# Patient Record
Sex: FEMALE | Race: WHITE | Employment: OTHER | URBAN - METROPOLITAN AREA
[De-identification: names, ages, dates, MRNs, and addresses within clinical notes are randomized per-mention and may not be internally consistent; named-entity substitution may affect disease eponyms.]

---

## 2018-02-14 ENCOUNTER — ALLIED HEALTH/NURSE VISIT (OUTPATIENT)
Dept: EDUCATION SERVICES | Facility: CLINIC | Age: 73
End: 2018-02-14
Payer: COMMERCIAL

## 2018-02-14 DIAGNOSIS — E11.9 TYPE 2 DIABETES, HBA1C GOAL < 7% (H): Primary | ICD-10-CM

## 2018-02-14 PROCEDURE — G0108 DIAB MANAGE TRN  PER INDIV: HCPCS

## 2018-02-14 NOTE — MR AVS SNAPSHOT
After Visit Summary   2/14/2018    Oriana Ely    MRN: [id number]           Patient Information     Date Of Birth          1945        Visit Information        Provider Department      2/14/2018 12:30 PM  DIABETIC ED RESOURCE [facility]        Care Instructions    My Diabetes Care Goals:    Monitoring: Continue to check blood sugars at minimum before breakfast daily. Goal blood sugar range of     Follow up:  Call ([phone]), e-mail ([email or web address]), or send [employer] message with questions, concerns or if follow-up is needed.     Bring blood glucose meter and logbook with you to all doctor and follow-up appointments.     [facility] for the [facility]:  For Your Diabetes Education and Nutrition Appointments Call:  [phone]   For Diabetes Education or Nutrition Related Questions:   Phone: [phone]  E-mail: [email or web address]  Fax: [phone]   If you need a medication refill please contact your pharmacy. Please allow 3 business days for your refills to be completed.    Instructions for emailing the Diabetes Educators    If you need to communicate a non-urgent message to a Diabetes Educator via email, please send to [email or web address].    Please follow the following email guidelines:    Subject line: Secure: your clinic name (example: Secure: [facility])  In the email please include: First name, middle initial, last name and date of birth.    We will be in touch with you within one (1) business day.           Follow-ups after your visit        Your next 10 appointments already scheduled     Mar 14, 2018 10:30 AM CDT   Diabetic Education with  DIABETIC ED RESOURCE   [facility] ([facility])    [address] [place] [address] [id number] [phone]              Who to contact     If you have questions or need follow up information  "about today's clinic visit or your schedule please contact [facility] directly at [phone].  Normal or non-critical lab and imaging results will be communicated to you by [employer], letter or phone within 4 business days after the clinic has received the results. If you do not hear from us within 7 days, please contact the clinic through [employer] or phone. If you have a critical or abnormal lab result, we will notify you by phone as soon as possible.  Submit refill requests through [employer] or call your pharmacy and they will forward the refill request to us. Please allow 3 business days for your refill to be completed.          Additional Information About Your Visit        [employer] Information     [employer]! lets you send messages to your doctor, view your test results, renew your prescriptions, schedule appointments and more. To sign up, go to [email or web address] . Click on \"Log in\" on the left side of the screen, which will take you to the Welcome page. Then click on \"Sign up Now\" on the right side of the page.     You will be asked to enter the access code listed below, as well as some personal information. Please follow the directions to create your username and password.     Your access code is: [email or web address]  Expires: 5/15/2018  1:25 PM     Your access code will  in 90 days. If you need help or a new code, please call your [facility] clinic or [phone].        Care EveryWhere ID     This is your Care EveryWhere ID. This could be used by other organizations to access your [facility] medical records  [id number]         Blood Pressure from Last 3 Encounters:   13 122/78   13 110/72   13 (!) 162/92    Weight from Last 3 Encounters:   13 71.4 kg (157 lb 6.4 oz)   13 70 kg (154 lb 6.4 oz)   13 70.3 kg (155 lb)              Today, you had the following     No orders found for display       Primary Care Provider Office Phone # Fax #    Meghan " Christ Soto [phone]       [facility] [address] [place] [address]        Equal Access to Services     BILL BALTAZAR : Hadamna disha bennett nidhi Soewelina, waaxda luqadaha, qaybta kaalmada richard, chana gao laKarelydelma sanchez. So [facility] [phone].    ATENCIÓN: Si habla español, tiene a rios disposición servicios gratuitos de asistencia lingüística. Llame al [phone].    We comply with applicable federal civil rights laws and [place] laws. We do not discriminate on the basis of race, color, national origin, age, disability, sex, sexual orientation, or gender identity.            Thank you!     Thank you for choosing [facility]  for your care. Our goal is always to provide you with excellent care. Hearing back from our patients is one way we can continue to improve our services. Please take a few minutes to complete the written survey that you may receive in the mail after your visit with us. Thank you!             Your Updated Medication List - Protect others around you: Learn how to safely use, store and throw away your medicines at www.disposemymeds.org.          This list is accurate as of 2/14/18  1:25 PM.  Always use your most recent med list.                   Brand Name Dispense Instructions for use Diagnosis    blood glucose monitoring test strip    no brand specified     1 strip by In Vitro route 3 times daily. Tests up to 4 times daily        gabapentin 300 MG capsule    NEURONTIN    90 capsule    Take 1 capsule by mouth At Bedtime.    Neuropathy in diabetes (H)       insulin glargine 100 UNIT/ML injection    LANTUS SOLOSTAR    3 Month    Inject 24 Units Subcutaneous At Bedtime.    Type 2 diabetes, HbA1c goal < 7% (H)       * insulin pen needle 31G X 5 MM     3 Box    1 Box by Device route See Admin Instructions. Pen needles of choice.  Use once a day    Diabetes mellitus, type 2 (H)       * ULTICARE MINI 31G X 6 MM    Generic drug:  insulin pen needle     100 each    Use daily or as directed.  Pen needle of choice.    Type 2 diabetes, HbA1c goal < 7% (H)       lisinopril-hydrochlorothiazide 20-12.5 MG per tablet    PRINZIDE/ZESTORETIC    90 tablet    Take 1 tablet by mouth daily.    Hypertension       metFORMIN 500 MG 24 hr tablet    GLUCOPHAGE-XR    360 tablet    4TPOQD    Type II or unspecified type diabetes mellitus without mention of complication, uncontrolled       order for DME     400 each    Test strips for pt's glucometer, brand as covered by insurance. Test four times daily and prn.    Type 2 diabetes, HbA1c goal < 7% (H)       pravastatin 40 MG tablet    PRAVACHOL    90 tablet    Take 1 tablet by mouth daily.    Other and unspecified hyperlipidemia       * Notice:  This list has 2 medication(s) that are the same as other medications prescribed for you. Read the directions carefully, and ask your doctor or other care provider to review them with you.

## 2018-02-14 NOTE — PATIENT INSTRUCTIONS
My Diabetes Care Goals:    Monitoring: Continue to check blood sugars at minimum before breakfast daily. Goal blood sugar range of     Follow up:  Call ([phone]), e-mail ([email or web address]), or send [employer] message with questions, concerns or if follow-up is needed.     Bring blood glucose meter and logbook with you to all doctor and follow-up appointments.     [facility] for the [facility]:  For Your Diabetes Education and Nutrition Appointments Call:  [phone]   For Diabetes Education or Nutrition Related Questions:   Phone: [phone]  E-mail: [email or web address]  Fax: [phone]   If you need a medication refill please contact your pharmacy. Please allow 3 business days for your refills to be completed.    Instructions for emailing the Diabetes Educators    If you need to communicate a non-urgent message to a Diabetes Educator via email, please send to [email or web address].    Please follow the following email guidelines:    Subject line: Secure: your clinic name (example: Secure: [facility])  In the email please include: First name, middle initial, last name and date of birth.    We will be in touch with you within one (1) business day.

## 2018-02-14 NOTE — PROGRESS NOTES
Diabetes Self Management Training: Individual Review Visit    Oriana Ely presents today for education and evaluation of glucose control related to Type 2 diabetes.    She is accompanied by self    Patient's diabetes management related comments/concerns: Meghan, her NP, told her to come here     Patient's emotional response to diabetes: expresses readiness to learn and frustration related to cost of meds    Patient would like this visit to be focused around the following diabetes-related behaviors and goals: Healthy Eating and Taking Medication    ASSESSMENT:  Patient Problem List and Family Medical History reviewed for relevant medical history, current medical status, and diabetes risk factors.    Current Diabetes Management per Patient:  Taking diabetes medications?   yes:     Diabetes Medication(s)     Biguanides Sig    metFORMIN (GLUCOPHAGE-XR) 500 MG 24 hr tablet 4TPOQD    Insulin Sig    insulin glargine (LANTUS SOLOSTAR) 100 UNIT/ML injection Inject 24 Units Subcutaneous At Bedtime.      Adjusting medications due to high cost of levemir. Currently taking 13 units of levemir daily. Was at 36 units/day and weaning per NP. Continues on metformin 2000 mg/day & glimepiride 4 mg BID. Plans to  and start a new medication today (referral mentions Farxiga but pt didn't think this was the name of it).    *Abbreviated insulin dose documentation key: Insulin Trade Name (oohhpbhem-zkstz-bliqtr-bedtime) - i.e. Humalog 5-5-5-0 (Humalog 5 units at breakfast, 5 units at lunch, and 5 units at dinner).    Past Diabetes Education: Yes    Patient glucose self monitoring as follows: has been testing the past 3 days but prior to this was not testing blood sugars at all.   BG meter: One Touch Ultra 2 meter  BG results:   Date Breakfast  Lunch  Dinner  Bedtime    Before After Before After Before After    2/11   308  311  203   2/12 307      395   2/13 237    382  328   2/14 228            BG values are: Not in  "goal  Hemoglobin A1C 9.1% on 1/18/18    Nutrition:  Patient skips lunch regularly    Breakfast - (10am) banana or piece of toast, occasionally oatmeal or dry cereal; sometimes skips  Lunch - skips   Dinner - (4-6pm) hot dish, bratwurst in bun, pork chops or steak, veggie & baked potato    Snacks - anything with sugar, homemade cookies (1-3/day), candy or chocolate-covered peanuts     Beverages: Coffee, Water, Crystal Light or Diet soda 2x/month    Cultural/Mormonism diet restrictions: No     Biggest Challenge to Healthy Eating: knowing what to eat    Physical Activity:    Not discussed    Diabetes Risk Factors:  age over 45 years, hypertension, hyperlipidemia, overweight/obesity and inactivity    Diabetes Complications:  Acute Complications: At risk for hypoglycemia? yes  Symptoms of hyperglycemia? none    Vitals:  There were no vitals taken for this visit.  Estimated body mass index is 29.26 kg/(m^2) as calculated from the following:    Height as of 4/17/13: 1.562 m (5' 1.5\").    Weight as of 4/17/13: 71.4 kg (157 lb 6.4 oz).   Last 3 BP:   BP Readings from Last 3 Encounters:   04/17/13 122/78   03/06/13 110/72   03/01/13 (!) 162/92       History   Smoking Status     Former Smoker     Quit date: 2/6/1973   Smokeless Tobacco     Never Used       Labs:  Chol 166  LDL 95  HDL 55  Above labs from 10/4/17    No results found for: MICROALBUMIN    Socio/Economic Considerations:    Support system: not assessed    Health Beliefs and Attitudes:   Patient Activation Measure Survey Score:  No flowsheet data found.    Stage of Change: PREPARATION (Decided to change - considering how)      Diabetes knowledge and skills assessment:     Patient is knowledgeable in diabetes management concepts related to: Healthy Eating, Monitoring and Taking Medication    Patient needs further education on the following diabetes management concepts: Healthy Eating, Being Active, Monitoring, Taking Medication, Problem Solving, Reducing Risks and " Healthy Coping    Barriers to Learning Assessment: No Barriers identified    Based on learning assessment above, most appropriate setting for further diabetes education would be: Individual setting.    INTERVENTION:   Pt's blood sugars above goal. Currently, pt is tapering insulin and starting a new medication due to high cost of insulin. Blood sugars, however, are not controlled. Will reach out to PCP to discuss option of NPH Relion insulin at [facility] as a more cost effective insulin option. Suspect she will need insulin to more optimally control her blood sugars.     Per IDC guidelines, to change from basal insulin to NPH with A1C >9%, it is a 1:1 conversion. Recommend to split 50:50. Showed her how to give an insulin injection with syringe and vial today in case her PCP is on board with this plan. If this is the plan, reiterated importance of consuming lunch each day as well as treatment of low blood sugars.     Education provided today on:  AADE Self-Care Behaviors:  Healthy Eating: consistency in amount, composition, and timing of food intake and weight reduction  Monitoring: log and interpret results, individual blood glucose targets and frequency of monitoring, reiterated importance of regularly checking blood sugar at home, at least once daily, for med adjustments.   Taking Medication: action of prescribed medication, drawing up, administering and storing injectable diabetes medications, proper site selection and rotation for injections, side effects of prescribed medications and when to take medications  Problem Solving: low blood glucose - causes, signs/symptoms, treatment and prevention    Opportunities for ongoing education and support in diabetes-self management were discussed.    Pt verbalized understanding of concepts discussed and recommendations provided today.       Education Materials Provided:  Hypoglycemia Signs and Symptoms, 1200 Calorie Meal Plan and How to Measure & Inject Insulin      PLAN:  Meal Plan Recommendation: eat 3 meals a day and use portion control  Check blood sugars fasting  Will reach out to PCP about switching pt to NPH insulin. Would recommend 18 units BID at breakfast and bedtime.  Will update pt upon talking to PCP.   AVS printed and provided to patient today.    FOLLOW-UP:  Follow-up appointment scheduled on 3/14/18.  Chart routed to referring provider.    Ongoing plan for education and support: Written resources (magazines, books, etc.), Follow-up visit with diabetes educator in 1 month and Follow-up with primary care provider    RICKEY Sweeney CDE  Time Spent: 60 minutes  Encounter Type: Individual    Any diabetes medication dose changes were made via the CDE Protocol and Collaborative Practice Agreement with the patient's referring provider. A copy of this encounter was shared with the provider.      Left message with MA at pt's PCP clinic re: elevated blood sugars and possible switch back to insulin but changing to a cheaper insulin. PCP is out today. Gave them our number to call back with plan.

## 2018-02-15 ENCOUNTER — TELEPHONE (OUTPATIENT)
Dept: EDUCATION SERVICES | Facility: CLINIC | Age: 73
End: 2018-02-15

## 2018-02-15 NOTE — TELEPHONE ENCOUNTER
As pt was on 36 units Levemir previously (has been tapering and is now on 13 units) and her A1c is >9%, recommend 1:1 conversion for 70/30.  Therefore, will initiate 18 u BID (before breakfast and dinner) of novolin 70/30. Will order syringe and vial for her d/t cost savings. Already instructed her on syringe/vial yesterday. Called pt to update her but no answer. Left vm with c/b number and will try to call tomorrow as well to update her and see which pharmacy she would like this order to be sent to.     Karin Borden RD LD CDE

## 2018-02-15 NOTE — TELEPHONE ENCOUNTER
Meghan, NATHEN, called for DOMINIK Frazier.     Meghan agrees that patient needs to restart insulin.     Verbal order for 70/30 mix BID for patient who may need better mealtime coverage. Also recommends consider vial and syringe for cost savings.     Meghan reports that Oriana's previous med dose was 36 units Levemir and 4 mg Glimepiride BID.     She asks that DOMINIK Frazier, work with patient and calculate dosing, provide instruction.     Call Meghan if any questions.     Edyta Valenzuela RD, LD, CDE

## 2018-02-16 ENCOUNTER — TELEPHONE (OUTPATIENT)
Dept: EDUCATION SERVICES | Facility: CLINIC | Age: 73
End: 2018-02-16

## 2018-02-16 NOTE — TELEPHONE ENCOUNTER
Oriana called again for Karin.   I called back as Karin is not available. No answer, left message that I will let Karin know and we will be in touch on Monday.     Edyta Valenzuela RD, LD, CDE

## 2018-02-16 NOTE — TELEPHONE ENCOUNTER
Called pt's PCP this morning to discuss plan. Discussed DC'ing her JENSEN with the addition of premixed 70/30 starting.  PCP is on board with this plan.  Also discussed starting dose of 18 u BID and ordering disposable syringes for pt.  PCP reports she will place the order for insulin and syringes as well as DC the glimepiride. Called pt to update her but no answer so left vm with c/b number and stated that her PCP did place a new order for her to her pharmacy. Will try to call again later today.  Did not explain that she will have to mix this insulin during our visit 2 days ago so would like to discuss this with her.     Karin Borden, RICKEY LD CDE

## 2018-02-19 NOTE — TELEPHONE ENCOUNTER
Called pt back as she tried calling 2/16 after I was gone for the day.  No answer. Left .     Karin Borden, RD LD CDE

## 2018-02-20 NOTE — TELEPHONE ENCOUNTER
Called again but still no answer. Left another vm. Unfortunately no direct line for this writer at this current clinic so left our triage number for c/b.     Karin Borden, RD LD CDE

## 2018-02-20 NOTE — TELEPHONE ENCOUNTER
Called pt back. No answer. Left vm. Will try to call again this afternoon to see if I reach her.     Karin Borden, RICKEY LD CDE

## 2018-02-21 ENCOUNTER — TELEPHONE (OUTPATIENT)
Dept: EDUCATION SERVICES | Facility: CLINIC | Age: 73
End: 2018-02-21

## 2018-02-21 NOTE — TELEPHONE ENCOUNTER
Pt is unable to afford insulin and is asking for an alternative med.     She says she was told it would cost 25. but the real cost was 138. She did not not know which insulin.    Pt is available for a return call today before noon then after 3pm.

## 2018-02-22 NOTE — TELEPHONE ENCOUNTER
Called pt. Her PCP had ordered the insulin and it did not go [facility] a [facility] so was more expensive than we discussed.  Tried to call her clinic this am but they are closed yet. Will call later today and have her PCP re-route the rx for her insulin vials to [facility] and specify the relion brand so it is the cheaper insulin.      Pt does not have any other questions at this time.   Karin Borden, RICKEY LD CDE

## 2018-03-14 ENCOUNTER — ALLIED HEALTH/NURSE VISIT (OUTPATIENT)
Dept: EDUCATION SERVICES | Facility: CLINIC | Age: 73
End: 2018-03-14
Payer: COMMERCIAL

## 2018-03-14 DIAGNOSIS — E11.9 TYPE 2 DIABETES, HBA1C GOAL < 7% (H): Primary | ICD-10-CM

## 2018-03-14 PROCEDURE — G0108 DIAB MANAGE TRN  PER INDIV: HCPCS

## 2018-03-14 NOTE — PATIENT INSTRUCTIONS
My Diabetes Care Goals:    45-60 grams of carbohydrate at meals, 15-30 grams of carbohydrate at snacks  150 minutes/week activity    Follow up:  Follow-up diabetes education appointment scheduled on 4/18/18.      Bring blood glucose meter and logbook with you to all doctor and follow-up appointments.     [facility] for the [facility]:  For Your Diabetes Education and Nutrition Appointments Call:  [phone]   For Diabetes Education or Nutrition Related Questions:   Phone: [phone]  E-mail: [email or web address]  Fax: [phone]   If you need a medication refill please contact your pharmacy. Please allow 3 business days for your refills to be completed.    Instructions for emailing the Diabetes Educators    If you need to communicate a non-urgent message to a Diabetes Educator via email, please send to [email or web address].    Please follow the following email guidelines:    Subject line: Secure: your clinic name (example: Secure: [facility])  In the email please include: First name, middle initial, last name and date of birth.    We will be in touch with you within one (1) business day.

## 2018-03-14 NOTE — PROGRESS NOTES
Agree with plan to increase insulin 10% to 22 units in the evening r/t elevated morning BGs.    Karin Borden, RD LD CDE

## 2018-03-14 NOTE — PROGRESS NOTES
Diabetes Self Management Training: Follow-up Visit    Oriana Ely presents today for education and evaluation of glucose control related to Type 2 diabetes.    She is accompanied by self    Patient's diabetes management related comments/concerns: follow up on blood sugars     Patient would like this visit to be focused around the following diabetes-related behaviors and goals: Healthy Eating, Being Active, Monitoring, Taking Medication and Problem Solving    ASSESSMENT:  Patient Problem List reviewed for relevant medical history and current medical status.    Current Diabetes Management per Patient:  Taking diabetes medications?   yes:     Diabetes Medication(s)     Biguanides Sig    metFORMIN (GLUCOPHAGE-XR) 500 MG 24 hr tablet 4TPOQD    Insulin Sig           Relion 70/30 18-0-0-18, started on 3/1/18 after she ran out of levemir    *Abbreviated insulin dose documentation key: Insulin Trade Name (skniimnes-zvdqr-mjlnqv-bedtime) - i.e. Humalog 5-5-5-0 (Humalog 5 units at breakfast, 5 units at lunch, and 5 units at dinner).    Patient glucose self monitoring as follows: one time daily.   BG meter: One Touch Ultra 2 meter  BG results:    Date Breakfast  Lunch  Dinner  Bedtime    Before After Before After Before After    3/14 263         3/13 221         3/12 187         3/11 321         3/10 176         3/9 284         3/8 274    94       BG values are: Not in goal, Mahamed  Patient's most recent   Lab Results   Component Value Date    A1C 11.6 04/17/2013    is not meeting goal of <7.0    Nutrition:  Patient skips breakfast and lunch regularly, states that there have been too many parties with foods at them and she has struggled with this    Breakfast - orange  Lunch - granola bar    Dinner - biggest meal - hot dish, stew, chili, hamburger or hot dog with chips  Snacks - candy if its available, crackers, pretzels, walnuts    Beverages: black coffee or water, diet soda 1-2x/month    Cultural/Rastafarian diet  "restrictions: No     Biggest Challenge to Healthy Eating: evening snacking, knowing what to eat and eating in social settings/gatherings    Physical Activity:    No physical activity at this time but she acknowledges that she needs to increase this     Diabetes Complications:  Acute Complications: At risk for hypoglycemia? yes  Frequency of hypoglycemia: never  Patient carries a carbohydrate source with them regularly: Yes     Vitals:  There were no vitals taken for this visit.  Estimated body mass index is 29.26 kg/(m^2) as calculated from the following:    Height as of 4/17/13: 1.562 m (5' 1.5\").    Weight as of 4/17/13: 71.4 kg (157 lb 6.4 oz).   Last 3 BP:   BP Readings from Last 3 Encounters:   04/17/13 122/78   03/06/13 110/72   03/01/13 (!) 162/92       History   Smoking Status     Former Smoker     Quit date: 2/6/1973   Smokeless Tobacco     Never Used       Labs:  Lab Results   Component Value Date    A1C 11.6 04/17/2013     Lab Results   Component Value Date     04/17/2013     Lab Results   Component Value Date    LDL 68 12/26/2012     HDL Cholesterol   Date Value Ref Range Status   12/26/2012 58 50 - 110 mg/dL Final   ]  GFR Estimate   Date Value Ref Range Status   04/17/2013 62 >60 mL/min/1.7m2 Final     GFR Estimate If Black   Date Value Ref Range Status   04/17/2013 76 >60 mL/min/1.7m2 Final     Lab Results   Component Value Date    CR 0.90 04/17/2013     No results found for: MICROALBUMIN    Health Beliefs and Attitudes:   Patient Activation Measure Survey Score:  No flowsheet data found.    Stage of Change: ACTION (Actively working towards change)    Diabetes knowledge and skills assessment:     Patient is knowledgeable in diabetes management concepts related to: Monitoring, Taking Medication and Problem Solving    Patient needs further education on the following diabetes management concepts: Healthy Eating, Being Active, Monitoring, Taking Medication, Problem Solving, Reducing Risks and " Healthy Coping    Barriers to Learning Assessment: No Barriers identified    Based on learning assessment above, most appropriate setting for further diabetes education would be: Group class or Individual setting.    INTERVENTION:  With 100% of pt's morning blood sugars outside of goal, pt would benefit from an increased dose of her ReliOn 70/30 insulin in the evening as well as an adjustment in the timing of her insulin dosing. Recommend she increase to 22 units prior to her evening meal, accounting for a 11% increase in her TDD, and continue with 18 units in the morning, prior to breakfast.  Would also recommend increased frequency of blood sugar checks to ensure that her numbers during the day are within goal with adjusted insulin dosing. Recommend continue with a morning fasting blood sugar check and then try to check once more during the day, either right before a meal or 2 hours after a meal.  Encouraged renewed focus on diet & exercise to also improve blood sugar control.     Education provided today on:  AADE Self-Care Behaviors:  Healthy Eating: carbohydrate counting, consistency in amount, composition, and timing of food intake, weight reduction and portion control  Being Active: relationship to blood glucose and describe appropriate activity program, recommended goal of 150 minutes/week physical activity   Monitoring: individual blood glucose targets and frequency of monitoring  Taking Medication: action of prescribed medication and discussed likely plan to increase evening dose of insulin and have pt take this prior to her dinner rather than prior to bedtime.   Problem Solving: low blood glucose - causes, signs/symptoms, treatment and prevention and carrying a carbohydrate source at all times    Opportunities for ongoing education and support in diabetes-self management were discussed.    Pt verbalized understanding of concepts discussed and recommendations provided today.       Education Materials  Provided:  Carbohydrate Counting    PLAN:  See Patient Instructions for co-developed, patient-stated behavior change goals.  Recommend increase to insulin - ReliOn 70/30 18-0-22-0  AVS printed and provided to patient today.    FOLLOW-UP:  Follow-up appointment scheduled on April 18, 2018.  Note faxed to referring provider at outside clinic.    Ongoing plan for education and support: Follow-up visit with diabetes educator in 1 month    Rachell Kim RD, LD  Time Spent: 45 minutes  Encounter Type: Individual    Any diabetes medication dose changes were made via the CDE Protocol and Collaborative Practice Agreement with the patient's referring provider. A copy of this encounter was shared with the provider.

## 2018-03-14 NOTE — MR AVS SNAPSHOT
After Visit Summary   3/14/2018    Oriana Ely    MRN: [id number]           Patient Information     Date Of Birth          1945        Visit Information        Provider Department      3/14/2018 10:30 AM  DIABETIC ED RESOURCE [facility]        Today's Diagnoses     Type 2 diabetes, HbA1c goal < 7% (H)    -  1      Care Instructions    My Diabetes Care Goals:    45-60 grams of carbohydrate at meals, 15-30 grams of carbohydrate at snacks  150 minutes/week activity    Follow up:  Follow-up diabetes education appointment scheduled on 4/18/18.      Bring blood glucose meter and logbook with you to all doctor and follow-up appointments.     [facility] for the [facility] Area:  For Your Diabetes Education and Nutrition Appointments Call:  [phone]   For Diabetes Education or Nutrition Related Questions:   Phone: [phone]  E-mail: [email or web address]  Fax: [phone]   If you need a medication refill please contact your pharmacy. Please allow 3 business days for your refills to be completed.    Instructions for emailing the Diabetes Educators    If you need to communicate a non-urgent message to a Diabetes Educator via email, please send to [email or web address].    Please follow the following email guidelines:    Subject line: Secure: your clinic name (example: Secure: [facility])  In the email please include: First name, middle initial, last name and date of birth.    We will be in touch with you within one (1) business day.             Follow-ups after your visit        Your next 10 appointments already scheduled     Apr 18, 2018 10:30 AM CDT   Diabetic Education with  DIABETIC ED RESOURCE   [facility] ([facility])    [address] [place] [id number] [phone]              Who to contact     If you have questions or need follow up information  "about today's clinic visit or your schedule please contact [facility] directly at [phone].  Normal or non-critical lab and imaging results will be communicated to you by CatchSquarehart, letter or phone within 4 business days after the clinic has received the results. If you do not hear from us within 7 days, please contact the clinic through CatchSquarehart or phone. If you have a critical or abnormal lab result, we will notify you by phone as soon as possible.  Submit refill requests through ShoeDazzle or call your pharmacy and they will forward the refill request to us. Please allow 3 business days for your refill to be completed.          Additional Information About Your Visit        CatchSquareharReading Room Information     ShoeDazzle lets you send messages to your doctor, view your test results, renew your prescriptions, schedule appointments and more. To sign up, go to [email or web address] . Click on \"Log in\" on the left side of the screen, which will take you to the Welcome page. Then click on \"Sign up Now\" on the right side of the page.     You will be asked to enter the access code listed below, as well as some personal information. Please follow the directions to create your username and password.     Your access code is: [email or web address]  Expires: 5/15/2018  2:25 PM     Your access code will  in 90 days. If you need help or a new code, please call your [facility] clinic or [phone].        Care EveryWhere ID     This is your Care EveryWhere ID. This could be used by other organizations to access your [facility] medical records  [id number]         Blood Pressure from Last 3 Encounters:   13 122/78   13 110/72   13 (!) 162/92    Weight from Last 3 Encounters:   13 71.4 kg (157 lb 6.4 oz)   13 70 kg (154 lb 6.4 oz)   13 70.3 kg (155 lb)              Today, you had the following     No orders found for display       Primary Care Provider Office Phone # Fax #    Meghan " Christ Soto [phone]       [facility] [address] [place] [address]        Equal Access to Services     BILL BALTAZAR : Hadamna disha bennett nidhi Soewelina, wasammyda luqadaha, qaybta kaalmada richard, chana gao ladcpaula sanchez. So [facility] [phone].    ATENCIÓN: Si habla español, tiene a rios disposición servicios gratuitos de asistencia lingüística. Llame al [phone].    We comply with applicable federal civil rights laws and [place] laws. We do not discriminate on the basis of race, color, national origin, age, disability, sex, sexual orientation, or gender identity.            Thank you!     Thank you for choosing [facility]  for your care. Our goal is always to provide you with excellent care. Hearing back from our patients is one way we can continue to improve our services. Please take a few minutes to complete the written survey that you may receive in the mail after your visit with us. Thank you!             Your Updated Medication List - Protect others around you: Learn how to safely use, store and throw away your medicines at www.disposemymeds.org.          This list is accurate as of 3/14/18 11:07 AM.  Always use your most recent med list.                   Brand Name Dispense Instructions for use Diagnosis    blood glucose monitoring test strip    no brand specified     1 strip by In Vitro route 3 times daily. Tests up to 4 times daily        gabapentin 300 MG capsule    NEURONTIN    90 capsule    Take 1 capsule by mouth At Bedtime.    Neuropathy in diabetes (H)       insulin glargine 100 UNIT/ML injection    LANTUS SOLOSTAR    3 Month    Inject 24 Units Subcutaneous At Bedtime.    Type 2 diabetes, HbA1c goal < 7% (H)       * insulin pen needle 31G X 5 MM     3 Box    1 Box by Device route See Admin Instructions. Pen needles of choice.  Use once a day    Diabetes mellitus, type 2 (H)       * ULTICARE MINI 31G X 6 MM    Generic drug:  insulin pen needle     100 each    Use daily or as directed.  Pen needle of choice.    Type 2 diabetes, HbA1c goal < 7% (H)       lisinopril-hydrochlorothiazide 20-12.5 MG per tablet    PRINZIDE/ZESTORETIC    90 tablet    Take 1 tablet by mouth daily.    Hypertension       metFORMIN 500 MG 24 hr tablet    GLUCOPHAGE-XR    360 tablet    4TPOQD    Type II or unspecified type diabetes mellitus without mention of complication, uncontrolled       order for DME     400 each    Test strips for pt's glucometer, brand as covered by insurance. Test four times daily and prn.    Type 2 diabetes, HbA1c goal < 7% (H)       pravastatin 40 MG tablet    PRAVACHOL    90 tablet    Take 1 tablet by mouth daily.    Other and unspecified hyperlipidemia       * Notice:  This list has 2 medication(s) that are the same as other medications prescribed for you. Read the directions carefully, and ask your doctor or other care provider to review them with you.

## 2018-04-17 ENCOUNTER — ALLIED HEALTH/NURSE VISIT (OUTPATIENT)
Dept: EDUCATION SERVICES | Facility: CLINIC | Age: 73
End: 2018-04-17
Payer: COMMERCIAL

## 2018-04-17 DIAGNOSIS — E11.9 TYPE 2 DIABETES, HBA1C GOAL < 7% (H): Primary | ICD-10-CM

## 2018-04-17 PROCEDURE — G0108 DIAB MANAGE TRN  PER INDIV: HCPCS

## 2018-04-17 PROCEDURE — 95250 CONT GLUC MNTR PHYS/QHP EQP: CPT

## 2018-04-17 NOTE — PATIENT INSTRUCTIONS
1. Plan to wear the LibrePro sensor for 14 days. It is okay to shower, bathe, and swim (up to 3 feet deep for 30 minutes)    2. Continue with your usual diabetes care plan - check blood sugars and take medicines, as prescribed.    3. Keep a log of what you eat and drink, when you take your medications and how much you take, and exercise you do while you are wearing the sensor.    3. Do not cover the sensor with extra adhesive (the small hole in the center of the sensor must remain uncovered)    4. Use a little extra care, especially when getting dressed or exercising, to avoid accidentally loosening or removing the sensor.     5. Remove the sensor if you need to have an MRI or CT scan.     Return the sensor to the [facility] on May 1.    Follow-up appointment: May 8.    [facility] for the [facility] Area:  For Your Diabetes Education and Nutrition Appointments Call:  [phone]   For Diabetes Education or Nutrition Related Questions:   Phone: [phone]  E-mail: [email or web address]  Fax: [phone]   If you need a medication refill please contact your pharmacy. Please allow 3 business days for your refills to be completed.    Instructions for emailing the Diabetes Educators    If you need to communicate a non-urgent message to a Diabetes Educator via email, please send to [email or web address].    Please follow the following email guidelines:    Subject line: Secure: your clinic name (example: Secure: [facility])  In the email please include: First name, middle initial, last name and date of birth.    We will be in touch with you within one (1) business day.

## 2018-04-17 NOTE — PROGRESS NOTES
Diabetes Self Management Training: Follow-up Visit    Oriana Ely presents today for education and evaluation of glucose control related to Type 2 diabetes.    She is accompanied by self    Patient's diabetes management related comments/concerns: feels the insulin isn't working    Patient would like this visit to be focused around the following diabetes-related behaviors and goals: Taking Medication    ASSESSMENT:  Patient Problem List reviewed for relevant medical history and current medical status.    Current Diabetes Management per Patient:  Taking diabetes medications?   yes:     Diabetes Medication(s)     Biguanides Sig    metFORMIN (GLUCOPHAGE-XR) 500 MG 24 hr tablet 4TPOQD    Insulin Sig    insulin NPH-insulin regular (HUMULIN MIX 70/30 VIAL) injection 18 units before breakfast  22 units before dinner        *Abbreviated insulin dose documentation key: Insulin Trade Name (lamloptxb-mowhz-qvpcpo-bedtime) - i.e. Humalog 5-5-5-0 (Humalog 5 units at breakfast, 5 units at lunch, and 5 units at dinner).    Patient glucose self monitoring as follows: one time daily.   BG meter: One Touch Ultra 2 meter  BG results:        BG values are: Not in goal  Patient's most recent   Lab Results   Component Value Date    A1C 11.6 04/17/2013    is not meeting goal of <7.0   Most recent Hgb A1C was 9.1% on 1/18/18    Nutrition:,  Patient skips lunch regularly    Breakfast - granola bar, clemetine OR banana  Lunch - sandwich OR soup OR leftovers OR skips   Dinner - sloppy joes OR meatloaf with mashed potatoes, peas OR hot dish     Snacks - clemetines, granola bar     Beverages: Crystal Light, black coffee, water    Cultural/Jainism diet restrictions: No     Biggest Challenge to Healthy Eating: skipping meals     Physical Activity:    Type: walking, ~10 blocks  Duration: 30+ minutes  Frequency: 3 days/week    Diabetes Complications:  Chronic Complications: neuropathy    Vitals:  There were no vitals taken for this  "visit.  Estimated body mass index is 29.26 kg/(m^2) as calculated from the following:    Height as of 4/17/13: 1.562 m (5' 1.5\").    Weight as of 4/17/13: 71.4 kg (157 lb 6.4 oz).   Last 3 BP:   BP Readings from Last 3 Encounters:   04/17/13 122/78   03/06/13 110/72   03/01/13 (!) 162/92       History   Smoking Status     Former Smoker     Quit date: 2/6/1973   Smokeless Tobacco     Never Used       Labs:  Lab Results   Component Value Date    A1C 11.6 04/17/2013     Lab Results   Component Value Date     04/17/2013     Lab Results   Component Value Date    LDL 68 12/26/2012     HDL Cholesterol   Date Value Ref Range Status   12/26/2012 58 50 - 110 mg/dL Final   ]  GFR Estimate   Date Value Ref Range Status   04/17/2013 62 >60 mL/min/1.7m2 Final     GFR Estimate If Black   Date Value Ref Range Status   04/17/2013 76 >60 mL/min/1.7m2 Final     Lab Results   Component Value Date    CR 0.90 04/17/2013     No results found for: MICROALBUMIN    Health Beliefs and Attitudes:   Patient Activation Measure Survey Score:  No flowsheet data found.    Stage of Change: ACTION (Actively working towards change)    Progress toward meeting diabetes-related behavioral goals:    GOALS % Met Goal   Healthy Eating  75   Physical Activity 75   Monitoring 75   Medication Taking 50   Problem Solving 50   Healthy Coping 0   Risk Reduction 25         Diabetes knowledge and skills assessment:     Patient is knowledgeable in diabetes management concepts related to: Healthy Eating, Being Active and Monitoring    Patient needs further education on the following diabetes management concepts: Healthy Eating, Being Active, Monitoring, Taking Medication, Problem Solving, Reducing Risks and Healthy Coping    Barriers to Learning Assessment: No Barriers identified    Based on learning assessment above, most appropriate setting for further diabetes education would be: Group class or Individual setting.    INTERVENTION:    Education provided " today on:  AADE Self-Care Behaviors:  Healthy Eating: consistency in amount, composition, and timing of food intake  Being Active: relationship to blood glucose  Monitoring: individual blood glucose targets and frequency of monitoring  Taking Medication: action of prescribed medication, side effects of prescribed medications and when to take medications. Patient has been taking evening insulin prior to bed. Discussed transitioning to take this med prior to dinner in order to help cover post-meal spikes and increasing both doses by 10%. New dosing of 20-0-24-0 recommended.    Problem Solving: low blood glucose - causes, signs/symptoms, treatment and prevention and carrying a carbohydrate source at all times  Reducing Risks: prevention, early diagnostic measures and treatment of complications and A1C - goals, relating to blood glucose levels, how often to check, patient due for Hgb A1c check. Will order this prior to next visit.     LibrePro sensor started today. (Lot # [id number], Serial # [id number], Expiration date 2018-10-31) Sensor was inserted with no resistance or bleeding at insertion site.    Pt will plan to wear the sensor through  5/1/18.    WRITTEN AND VERBAL INFORMATION GIVEN TO SUPPORT UNDERSTANDING OF:  LibrePro CGM: Sensor insertion, intention of monitoring for 14 days. Keep records of BG, food intake, exercise, and medication dosing during wear.       Patient verbalizes understanding of how to remove sensor and all instructions provided.     Educational and other materials:  Food/exercise/medication log sheets  Contact information  Return Check List    Opportunities for ongoing education and support in diabetes-self management were discussed.    Pt verbalized understanding of concepts discussed and recommendations provided today.       Education Materials Provided:  BG Log Sheet    PLAN:  See Patient Instructions for co-developed, patient-stated behavior change goals.  AVS printed and provided to  patient today.    FOLLOW-UP:  Patient to drop off CGM at clinic on or around 5/1/18.  Follow-up appointment scheduled on 5/8/18.  Chart faxed to referring provider at outside clinic.    Ongoing plan for education and support: Follow-up visit with diabetes educator in 3 weeks to review CGM results.    Rachell Kim RD, LD  Time Spent: 45 minutes  Encounter Type: Individual    Any diabetes medication dose changes were made via the CDE Protocol and Collaborative Practice Agreement with the patient's referring provider. A copy of this encounter was shared with the provider.

## 2018-04-17 NOTE — MR AVS SNAPSHOT
After Visit Summary   4/17/2018    Oriana Ely    MRN: [id number]           Patient Information     Date Of Birth          1945        Visit Information        Provider Department      4/17/2018 9:30 AM  DIABETIC ED RESOURCE [facility] [place] [facility]        Today's Diagnoses     Type 2 diabetes, HbA1c goal < 7% (H)    -  1      Care Instructions    1. Plan to wear the LibrePro sensor for 14 days. It is okay to shower, bathe, and swim (up to 3 feet deep for 30 minutes)    2. Continue with your usual diabetes care plan - check blood sugars and take medicines, as prescribed.    3. Keep a log of what you eat and drink, when you take your medications and how much you take, and exercise you do while you are wearing the sensor.    3. Do not cover the sensor with extra adhesive (the small hole in the center of the sensor must remain uncovered)    4. Use a little extra care, especially when getting dressed or exercising, to avoid accidentally loosening or removing the sensor.     5. Remove the sensor if you need to have an MRI or CT scan.     Return the sensor to the [facility] on May 1.    Follow-up appointment: May 8.    [facility] for the [facility]:  For Your Diabetes Education and Nutrition Appointments Call:  [phone]   For Diabetes Education or Nutrition Related Questions:   Phone: [phone]  E-mail: [email or web address]  Fax: [phone]   If you need a medication refill please contact your pharmacy. Please allow 3 business days for your refills to be completed.    Instructions for emailing the Diabetes Educators    If you need to communicate a non-urgent message to a Diabetes Educator via email, please send to [email or web address].    Please follow the following email guidelines:    Subject line: Secure: your clinic name (example: Secure: [facility])  In the email please include: First name, middle initial, last  "name and date of birth.    We will be in touch with you within one (1) business day.            Follow-ups after your visit        Your next 10 appointments already scheduled     May 08, 2018  9:30 AM CDT   Diabetic Education with  DIABETIC ED RESOURCE   [facility] ([facility])    [address] [place] [id number] [phone]              Who to contact     If you have questions or need follow up information about today's clinic visit or your schedule please contact [facility] directly at [phone].  Normal or non-critical lab and imaging results will be communicated to you by Wellpepperhart, letter or phone within 4 business days after the clinic has received the results. If you do not hear from us within 7 days, please contact the clinic through Wellpepperhart or phone. If you have a critical or abnormal lab result, we will notify you by phone as soon as possible.  Submit refill requests through Bjond or call your pharmacy and they will forward the refill request to us. Please allow 3 business days for your refill to be completed.          Additional Information About Your Visit        MyChart Information     Bjond lets you send messages to your doctor, view your test results, renew your prescriptions, schedule appointments and more. To sign up, go to [email or web address] . Click on \"Log in\" on the left side of the screen, which will take you to the Welcome page. Then click on \"Sign up Now\" on the right side of the page.     You will be asked to enter the access code listed below, as well as some personal information. Please follow the directions to create your username and password.     Your access code is: [email or web address]  Expires: 5/15/2018  2:25 PM     Your access code will  in 90 days. If you need help or a new code, please call your [facility] or [phone].        Care EveryWhere ID     This is your Care EveryWhere " ID. This could be used by other organizations to access your [facility] medical records  [id number]         Blood Pressure from Last 3 Encounters:   04/17/13 122/78   03/06/13 110/72   03/01/13 (!) 162/92    Weight from Last 3 Encounters:   04/17/13 71.4 kg (157 lb 6.4 oz)   03/01/13 70 kg (154 lb 6.4 oz)   01/16/13 70.3 kg (155 lb)              Today, you had the following     No orders found for display       Primary Care Provider Office Phone # Fax #    Meghan Christ Soto [phone]       [facility] [address] [place] [address]        Equal Access to Services     BILL BALTAZAR : Hadii aad ku hadasho Sowallaceali, waaxda luqadaha, qaybta kaalmada adeegyada, chana pina . So [facility] [phone].    ATENCIÓN: Si habla español, tiene a rios disposición servicios gratuitos de asistencia lingüística. Mayank al [phone].    We comply with applicable federal civil rights laws and [place] laws. We do not discriminate on the basis of race, color, national origin, age, disability, sex, sexual orientation, or gender identity.            Thank you!     Thank you for choosing [facility]  for your care. Our goal is always to provide you with excellent care. Hearing back from our patients is one way we can continue to improve our services. Please take a few minutes to complete the written survey that you may receive in the mail after your visit with us. Thank you!             Your Updated Medication List - Protect others around you: Learn how to safely use, store and throw away your medicines at www.disposemymeds.org.          This list is accurate as of 4/17/18 10:16 AM.  Always use your most recent med list.                   Brand Name Dispense Instructions for use Diagnosis    blood glucose monitoring test strip    no brand specified     1 strip by In Vitro route 3 times daily. Tests up to 4 times daily        gabapentin  300 MG capsule    NEURONTIN    90 capsule    Take 1 capsule by mouth At Bedtime.    Neuropathy in diabetes (H)       insulin NPH-insulin regular injection    HumuLIN MIX 70/30 VIAL    10 mL    18 units before breakfast  22 units before dinner    Type 2 diabetes, HbA1c goal < 7% (H)       * insulin pen needle 31G X 5 MM     3 Box    1 Box by Device route See Admin Instructions. Pen needles of choice.  Use once a day    Diabetes mellitus, type 2 (H)       * ULTICARE MINI 31G X 6 MM   Generic drug:  insulin pen needle     100 each    Use daily or as directed.  Pen needle of choice.    Type 2 diabetes, HbA1c goal < 7% (H)       lisinopril-hydrochlorothiazide 20-12.5 MG per tablet    PRINZIDE/ZESTORETIC    90 tablet    Take 1 tablet by mouth daily.    Hypertension       metFORMIN 500 MG 24 hr tablet    GLUCOPHAGE-XR    360 tablet    4TPOQD    Type II or unspecified type diabetes mellitus without mention of complication, uncontrolled       order for DME     400 each    Test strips for pt's glucometer, brand as covered by insurance. Test four times daily and prn.    Type 2 diabetes, HbA1c goal < 7% (H)       pravastatin 40 MG tablet    PRAVACHOL    90 tablet    Take 1 tablet by mouth daily.    Other and unspecified hyperlipidemia       * Notice:  This list has 2 medication(s) that are the same as other medications prescribed for you. Read the directions carefully, and ask your doctor or other care provider to review them with you.

## 2018-04-18 NOTE — PROGRESS NOTES
Agree with educator to increase insulin 10% as most of BG's are elevated. Also, agree with rec to take insulin prior to evening meal.     Karin Borden, RD LD CDE

## 2018-04-19 ENCOUNTER — TELEPHONE (OUTPATIENT)
Dept: EDUCATION SERVICES | Facility: CLINIC | Age: 73
End: 2018-04-19

## 2018-04-19 NOTE — TELEPHONE ENCOUNTER
Spoke with patient over the phone to confirm increase in ReliOn 70/30 dosing. Provided recommendation of 20-0-24-0. Patient verbalized understanding and has already been taking her evening dose prior to dinner over the last few days since visit. No further questions or concerns. Follow up in several weeks to review CGM data.   Rachell Kim RD, LD

## 2018-05-08 ENCOUNTER — ALLIED HEALTH/NURSE VISIT (OUTPATIENT)
Dept: EDUCATION SERVICES | Facility: CLINIC | Age: 73
End: 2018-05-08
Payer: COMMERCIAL

## 2018-05-08 DIAGNOSIS — E11.9 TYPE 2 DIABETES, HBA1C GOAL < 7% (H): Primary | ICD-10-CM

## 2018-05-08 PROCEDURE — G0108 DIAB MANAGE TRN  PER INDIV: HCPCS

## 2018-05-08 NOTE — PROGRESS NOTES
LibrePro Continuous Glucose Monitor Interpretation     Patient History:   1. Type of Diabetes: Type 2 diabetes  2. Duration of diabetes or year of diagnosis: 5 years  3. Current treatment regimen (include all diabetes medications, dose & dosing frequency/timing):   yes:     Diabetes Medication(s)     Biguanides Sig    metFORMIN (GLUCOPHAGE-XR) 500 MG 24 hr tablet 4TPOQD    Insulin Sig    insulin NPH-insulin regular (HUMULIN MIX 70/30 VIAL) injection 20 units before breakfast  24 units before dinner        *Abbreviated insulin dose documentation key: Insulin Trade Name (hlfzffmwj-cdjkh-qegksw-bedtime) - i.e. Humalog 5-5-5-0 (Humalog 5 units at breakfast, 5 units at lunch, and 5 units at dinner).  4. Most Recent A1c Result:    Lab Results   Component Value Date    A1C 11.6 2013     5. Indication/s for LibrePro study: Difficult to manage hypoglycemia and/or hyperglycemia, despite multiple adjustments in self-monitoring of blood glucose and diabetes medication administration.    Statistics:   1. Sensor worn for 14 days.  2. Glucose excursions:   Percent in target is: 47%  Percent above target is: 52%  Percent below target is: 1%                        Data evaluation:   1. Sensor modal day evaluation shows the followin. Consistent day-to-day patterns noted: pattern of daytime hyperglycemia  2. pattern of nocturnal hyperglycemia.  3. Average glucose: 191 mg/dL.  4. Low glucose events: 3              Patient's Logbook shows the following:   Carbohydrate counting is: patient does not carbohydrate count but is eating 3 modest meals daily with some snacking  Medication and/or insulin dosing is: inaccurate. Patient complains that she often has been forgetting to take her evening dose before dinner rather than before bed. She is motivated to continue to work on this though.              Assessment and Plan:  Recommend increase to insulin - continue 20u Relion 70/30 in the AM, increase to 26u Relion 70/30 in the PM  & be sure to take before dinner. This is a 4.5% increase in TDD.   Encouraged patient to continue to follow carb controlled diet. Discussed some foods that could potentially contribute to higher blood sugars seen - grapes, bananas, oatmeal. Also recommended a small carb & protein snack in the evening before bed to help avoid liver spilling glucose overnight.   Also recommended updated Hgb A1C check before next appt. Orders placed.   Will follow up in 2 months to see how things are going.       Rachell Kim RD, LD  Time Spent: 30 minutes  Any diabetes medication dose changes were made via the CDE Protocol and Collaborative Practice Agreement with the patient's referring provider. A copy of this encounter was shared with the provider.

## 2018-05-08 NOTE — PATIENT INSTRUCTIONS
My Diabetes Care Goals:    Taking Medication: I will increase my evening dose of insulin to 26 units and take this before dinner each night.     Follow up:  Follow-up diabetes education appointment scheduled on 7/10.      Bring blood glucose meter and logbook with you to all doctor and follow-up appointments.     [facility] for the [facility]:  For Your Diabetes Education and Nutrition Appointments Call:  [phone]   For Diabetes Education or Nutrition Related Questions:   Phone: [phone]  E-mail: [email or web address]  Fax: [phone]   If you need a medication refill please contact your pharmacy. Please allow 3 business days for your refills to be completed.    Instructions for emailing the Diabetes Educators    If you need to communicate a non-urgent message to a Diabetes Educator via email, please send to [email or web address].    Please follow the following email guidelines:    Subject line: Secure: your clinic name (example: Secure: [place])  In the email please include: First name, middle initial, last name and date of birth.    We will be in touch with you within one (1) business day.

## 2018-05-08 NOTE — MR AVS SNAPSHOT
After Visit Summary   5/8/2018    Oriana Ely    MRN: [id number]           Patient Information     Date Of Birth          1945        Visit Information        Provider Department      5/8/2018 9:30 AM  DIABETIC ED RESOURCE [facility]        Today's Diagnoses     Type 2 diabetes, HbA1c goal < 7% (H)    -  1      Care Instructions    My Diabetes Care Goals:    Taking Medication: I will increase my evening dose of insulin to 26 units and take this before dinner each night.     Follow up:  Follow-up diabetes education appointment scheduled on 7/10.      Bring blood glucose meter and logbook with you to all doctor and follow-up appointments.     [facility] for the [facility] Area:  For Your Diabetes Education and Nutrition Appointments Call:  [phone]   For Diabetes Education or Nutrition Related Questions:   Phone: [phone]  E-mail: [email or web address]  Fax: [phone]   If you need a medication refill please contact your pharmacy. Please allow 3 business days for your refills to be completed.    Instructions for emailing the Diabetes Educators    If you need to communicate a non-urgent message to a Diabetes Educator via email, please send to [email or web address].    Please follow the following email guidelines:    Subject line: Secure: your clinic name (example: Secure: [place])  In the email please include: First name, middle initial, last name and date of birth.    We will be in touch with you within one (1) business day.             Follow-ups after your visit        Your next 10 appointments already scheduled     Jul 10, 2018 10:30 AM CDT   Diabetic Education with  DIABETIC ED RESOURCE   [facility] ([facility])    [address] [place] [id number] [phone]              Who to contact     If you have questions or need follow up  "information about today's clinic visit or your schedule please contact [facility] directly at [phone].  Normal or non-critical lab and imaging results will be communicated to you by MyChart, letter or phone within 4 business days after the clinic has received the results. If you do not hear from us within 7 days, please contact the clinic through Rexlyhart or phone. If you have a critical or abnormal lab result, we will notify you by phone as soon as possible.  Submit refill requests through Insider Pages or call your pharmacy and they will forward the refill request to us. Please allow 3 business days for your refill to be completed.          Additional Information About Your Visit        [facility] Information     Insider Pages lets you send messages to your doctor, view your test results, renew your prescriptions, schedule appointments and more. To sign up, go to [email or web address] . Click on \"Log in\" on the left side of the screen, which will take you to the Welcome page. Then click on \"Sign up Now\" on the right side of the page.     You will be asked to enter the access code listed below, as well as some personal information. Please follow the directions to create your username and password.     Your access code is: [email or web address]  Expires: 5/15/2018  2:25 PM     Your access code will  in 90 days. If you need help or a new code, please call your [place] clinic or [phone].        Care EveryWhere ID     This is your Care EveryWhere ID. This could be used by other organizations to access your [place] medical records  [id number]         Blood Pressure from Last 3 Encounters:   13 122/78   13 110/72   13 (!) 162/92    Weight from Last 3 Encounters:   13 71.4 kg (157 lb 6.4 oz)   13 70 kg (154 lb 6.4 oz)   13 70.3 kg (155 lb)              Today, you had the following     No orders found for display       Primary Care Provider Office Phone # Fax #    " Meghan Soto [phone]       [facility] [address] [place] [address]        Equal Access to Services     BILL BALTAZAR : Hadii disha bennett kavono Sowallaceali, waaxda luqadaha, qaybta kaalmada adejeferson, chana madrid delroyusman gao ladcpaula sanchez. So [facility] [phone].    ATENCIÓN: Si habla español, tiene a rios disposición servicios gratuitos de asistencia lingüística. Llame al [phone].    We comply with applicable federal civil rights laws and [place] laws. We do not discriminate on the basis of race, color, national origin, age, disability, sex, sexual orientation, or gender identity.            Thank you!     Thank you for choosing [facility]  for your care. Our goal is always to provide you with excellent care. Hearing back from our patients is one way we can continue to improve our services. Please take a few minutes to complete the written survey that you may receive in the mail after your visit with us. Thank you!             Your Updated Medication List - Protect others around you: Learn how to safely use, store and throw away your medicines at www.disposemymeds.org.          This list is accurate as of 5/8/18  9:58 AM.  Always use your most recent med list.                   Brand Name Dispense Instructions for use Diagnosis    blood glucose monitoring test strip    no brand specified     1 strip by In Vitro route 3 times daily. Tests up to 4 times daily        gabapentin 300 MG capsule    NEURONTIN    90 capsule    Take 1 capsule by mouth At Bedtime.    Neuropathy in diabetes (H)       insulin NPH-insulin regular injection    HumuLIN MIX 70/30 VIAL    10 mL    18 units before breakfast  22 units before dinner    Type 2 diabetes, HbA1c goal < 7% (H)       * insulin pen needle 31G X 5 MM     3 Box    1 Box by Device route See Admin Instructions. Pen needles of choice.  Use once a day    Diabetes mellitus, type 2 (H)       *  ULTICARE MINI 31G X 6 MM   Generic drug:  insulin pen needle     100 each    Use daily or as directed.  Pen needle of choice.    Type 2 diabetes, HbA1c goal < 7% (H)       lisinopril-hydrochlorothiazide 20-12.5 MG per tablet    PRINZIDE/ZESTORETIC    90 tablet    Take 1 tablet by mouth daily.    Hypertension       metFORMIN 500 MG 24 hr tablet    GLUCOPHAGE-XR    360 tablet    4TPOQD    Type II or unspecified type diabetes mellitus without mention of complication, uncontrolled       order for DME     400 each    Test strips for pt's glucometer, brand as covered by insurance. Test four times daily and prn.    Type 2 diabetes, HbA1c goal < 7% (H)       pravastatin 40 MG tablet    PRAVACHOL    90 tablet    Take 1 tablet by mouth daily.    Other and unspecified hyperlipidemia       * Notice:  This list has 2 medication(s) that are the same as other medications prescribed for you. Read the directions carefully, and ask your doctor or other care provider to review them with you.

## 2018-05-10 NOTE — PROGRESS NOTES
Agree with recommendation to increase insulin in the evening for elevated overnight blood sugars and for educator to order A1c test.     Karin Borden, RICKEY LD CDE

## 2018-07-10 ENCOUNTER — ALLIED HEALTH/NURSE VISIT (OUTPATIENT)
Dept: EDUCATION SERVICES | Facility: CLINIC | Age: 73
End: 2018-07-10
Payer: COMMERCIAL

## 2018-07-10 DIAGNOSIS — E11.9 TYPE 2 DIABETES, HBA1C GOAL < 7% (H): ICD-10-CM

## 2018-07-10 NOTE — PROGRESS NOTES
"Diabetes Self Management Training: Follow-up Visit    Oriana Ely presents today for evaluation of glucose control Type 2 diabetes.    She is accompanied by self    Patient's diabetes management related comments/concerns: patient forgot her meter, has no other questions about her diabetes     Patient would like this visit to be focused around the following diabetes-related behaviors and goals: not discussed     ASSESSMENT:  Patient Problem List reviewed for relevant medical history and current medical status.    Current Diabetes Management per Patient:  Taking diabetes medications?   yes:     Diabetes Medication(s)     Biguanides Sig    metFORMIN (GLUCOPHAGE-XR) 500 MG 24 hr tablet 4TPOQD    Insulin Sig    insulin NPH-insulin regular (HUMULIN MIX 70/30 VIAL) injection 18 units before breakfast  22 units before dinner      Relion 70/30 - 20-0-26-0     Do you have any difficulty affording your medications or glucose monitoring supplies?  Not discussed     *Abbreviated insulin dose documentation key: Insulin trade name (zyenofrob-tymtt-bgfccb-bedtime) - i.e. Humalog 5-5-5-0 (Humalog 5 units at breakfast, 5 units at lunch, and 5 units at dinner).  Patient did not bring her meter so unable to fully assess BG results. Per patient report:   Patient glucose self monitoring as follows: one time daily.   BG results: fasting glucose- 120-190s     BG values are: unable to assess  Patient's most recent   Lab Results   Component Value Date    A1C 11.6 04/17/2013   Last A1C of 9.1% on 1/18/18 - not meeting goal <7%    Nutrition:  Patient currently eats 3 meals per day. She is taking her insulin before meals. She states she feels she is eating less these days because she gets full more quickly. She also is eating less red meat and states she doesn't like the taste anymore. No other major changes in diet. She feels she is doing \"ok\" with CHO controlled diet.     Cultural/Gnosticism diet restrictions: No     Biggest " "Challenge to Healthy Eating: knowing what to eat    Physical Activity:    Not discussed    Diabetes Complications:  Not discussed today.    Recent health service and resource utilization related to diabetes (hyperglycemia, hypoglycemia, etc.):  None    Vitals:  There were no vitals taken for this visit.  Estimated body mass index is 29.26 kg/(m^2) as calculated from the following:    Height as of 4/17/13: 1.562 m (5' 1.5\").    Weight as of 4/17/13: 71.4 kg (157 lb 6.4 oz).   Last 3 BP:   BP Readings from Last 3 Encounters:   04/17/13 122/78   03/06/13 110/72   03/01/13 (!) 162/92       History   Smoking Status     Former Smoker     Quit date: 2/6/1973   Smokeless Tobacco     Never Used       Labs:  Lab Results   Component Value Date    A1C 11.6 04/17/2013     Lab Results   Component Value Date     04/17/2013     Lab Results   Component Value Date    LDL 68 12/26/2012     HDL Cholesterol   Date Value Ref Range Status   12/26/2012 58 50 - 110 mg/dL Final   ]  GFR Estimate   Date Value Ref Range Status   04/17/2013 62 >60 mL/min/1.7m2 Final     GFR Estimate If Black   Date Value Ref Range Status   04/17/2013 76 >60 mL/min/1.7m2 Final     Lab Results   Component Value Date    CR 0.90 04/17/2013     No results found for: MICROALBUMIN    Health Beliefs and Attitudes:   Patient Activation Measure Survey Score:  No flowsheet data found.    Stage of Change: ACTION (Actively working towards change)    Progress toward meeting diabetes-related behavioral goals:  Not assessed at this visit     Diabetes knowledge and skills assessment:     Patient is knowledgeable in diabetes management concepts related to: Healthy Eating and Taking Medication    Patient needs further education on the following diabetes management concepts: Monitoring and Taking Medication    Barriers to Learning Assessment: No Barriers identified    Based on learning assessment above, most appropriate setting for further diabetes education would be: Group " class or Individual setting.    INTERVENTION:  Discussion - patient did not bring in meter so visit was very short. Explained that we can't make any changes to her current insulin regimen without BG results. She is understanding of this and overall, feels that her BGs have improved significnatly. No complaints of low BGs. She is understanding of the need to take her insulin with meals and that she needs a semi-consistent meal schedule. She has no questions or concerns today.     PLAN:  No changes in the patient's current treatment plan    FOLLOW-UP:  Follow-up with PCP recommended.  Follow-up yearly for diabetes education review.  Chart routed to referring provider.    Ongoing plan for education and support: Written resources (magazines, books, etc.) and Follow-up with primary care provider    Rachell Kim RD, LD   Time Spent: 10 minutes  Encounter Type: Individual    Any diabetes medication dose changes were made via the CDE Protocol and Collaborative Practice Agreement with the patient's referring provider. A copy of this encounter was shared with the provider.

## 2018-07-10 NOTE — MR AVS SNAPSHOT
After Visit Summary   7/10/2018    Oriana Eyl    MRN: [id number]           Patient Information     Date Of Birth          1945        Visit Information        Provider Department      7/10/2018 10:30 AM [facility]        Today's Diagnoses     Type 2 diabetes, HbA1c goal < 7% (H)           Follow-ups after your visit        Who to contact     If you have questions or need follow up information about today's clinic visit or your schedule please contact [facility] directly at [phone].  Normal or non-critical lab and imaging results will be communicated to you by MyChart, letter or phone within 4 business days after the clinic has received the results. If you do not hear from us within 7 days, please contact the clinic through MyChart or phone. If you have a critical or abnormal lab result, we will notify you by phone as soon as possible.  Submit refill requests through [employer] or call your pharmacy and they will forward the refill request to us. Please allow 3 business days for your refill to be completed.          Additional Information About Your Visit        Care EveryWhere ID     This is your Care EveryWhere ID. This could be used by other organizations to access your [facility] medical records  [id number]         Blood Pressure from Last 3 Encounters:   04/17/13 122/78   03/06/13 110/72   03/01/13 (!) 162/92    Weight from Last 3 Encounters:   04/17/13 71.4 kg (157 lb 6.4 oz)   03/01/13 70 kg (154 lb 6.4 oz)   01/16/13 70.3 kg (155 lb)              Today, you had the following     No orders found for display         Today's Medication Changes          These changes are accurate as of 7/10/18 11:10 AM.  If you have any questions, ask your nurse or doctor.               These medicines have changed or have updated prescriptions.        Dose/Directions    HumuLIN MIX 70/30 VIAL injection   This may have  changed:  additional instructions   Used for:  Type 2 diabetes, HbA1c goal < 7% (H)   Generic drug:  insulin NPH-insulin regular        20 units before breakfast  26 units before dinner   Quantity:  10 mL   Refills:  1                Primary Care Provider Office Phone # Fax #    Meghan Polo NATHEN Soto [phone]       [facility] [address], [place], [address] [facility] [address] [place] [address]        Equal Access to Services     [facility]: Hadii aad ku hadasho Soomaali, waaxda luqadaha, qaybta kaalmada adeegyada, waxay idiin hayaan adeeg kharash la'aan ah. So [facility] [phone].    ATENCIÓN: Si habla espolga, tiene a rios disposición servicios gratuitos de asistencia lingüística. Llame al [phone].    We comply with applicable federal civil rights laws and [place] laws. We do not discriminate on the basis of race, color, national origin, age, disability, sex, sexual orientation, or gender identity.            Thank you!     Thank you for choosing [facility]  for your care. Our goal is always to provide you with excellent care. Hearing back from our patients is one way we can continue to improve our services. Please take a few minutes to complete the written survey that you may receive in the mail after your visit with us. Thank you!             Your Updated Medication List - Protect others around you: Learn how to safely use, store and throw away your medicines at www.disposemymeds.org.          This list is accurate as of 7/10/18 11:10 AM.  Always use your most recent med list.                   Brand Name Dispense Instructions for use Diagnosis    blood glucose monitoring test strip    no brand specified     1 strip by In Vitro route 3 times daily. Tests up to 4 times daily        gabapentin 300 MG capsule    NEURONTIN    90 capsule    Take 1 capsule by mouth At Bedtime.    Neuropathy in diabetes (H)       HumuLIN MIX 70/30 VIAL injection   Generic drug:  insulin  NPH-insulin regular     10 mL    20 units before breakfast  26 units before dinner    Type 2 diabetes, HbA1c goal < 7% (H)       * insulin pen needle 31G X 5 MM     3 Box    1 Box by Device route See Admin Instructions. Pen needles of choice.  Use once a day    Diabetes mellitus, type 2 (H)       * ULTICARE MINI 31G X 6 MM   Generic drug:  insulin pen needle     100 each    Use daily or as directed.  Pen needle of choice.    Type 2 diabetes, HbA1c goal < 7% (H)       lisinopril-hydrochlorothiazide 20-12.5 MG per tablet    PRINZIDE/ZESTORETIC    90 tablet    Take 1 tablet by mouth daily.    Hypertension       metFORMIN 500 MG 24 hr tablet    GLUCOPHAGE-XR    360 tablet    4TPOQD    Type II or unspecified type diabetes mellitus without mention of complication, uncontrolled       order for DME     400 each    Test strips for pt's glucometer, brand as covered by insurance. Test four times daily and prn.    Type 2 diabetes, HbA1c goal < 7% (H)       pravastatin 40 MG tablet    PRAVACHOL    90 tablet    Take 1 tablet by mouth daily.    Other and unspecified hyperlipidemia       * Notice:  This list has 2 medication(s) that are the same as other medications prescribed for you. Read the directions carefully, and ask your doctor or other care provider to review them with you.

## 2021-01-30 ENCOUNTER — IMMUNIZATION (OUTPATIENT)
Dept: NURSING | Facility: CLINIC | Age: 76
End: 2021-01-30
Payer: COMMERCIAL

## 2021-01-30 PROCEDURE — 0001A PR COVID VAC PFIZER DIL RECON 30 MCG/0.3 ML IM: CPT

## 2021-01-30 PROCEDURE — 91300 PR COVID VAC PFIZER DIL RECON 30 MCG/0.3 ML IM: CPT

## 2021-02-20 ENCOUNTER — IMMUNIZATION (OUTPATIENT)
Dept: NURSING | Facility: CLINIC | Age: 76
End: 2021-02-20
Attending: INTERNAL MEDICINE
Payer: COMMERCIAL

## 2021-02-20 PROCEDURE — 0002A PR COVID VAC PFIZER DIL RECON 30 MCG/0.3 ML IM: CPT

## 2021-02-20 PROCEDURE — 91300 PR COVID VAC PFIZER DIL RECON 30 MCG/0.3 ML IM: CPT

## 2021-03-21 ENCOUNTER — HEALTH MAINTENANCE LETTER (OUTPATIENT)
Age: 76
End: 2021-03-21

## 2021-09-04 ENCOUNTER — HEALTH MAINTENANCE LETTER (OUTPATIENT)
Age: 76
End: 2021-09-04

## 2021-10-30 ENCOUNTER — HEALTH MAINTENANCE LETTER (OUTPATIENT)
Age: 76
End: 2021-10-30

## 2022-04-16 ENCOUNTER — HEALTH MAINTENANCE LETTER (OUTPATIENT)
Age: 77
End: 2022-04-16

## 2022-06-11 ENCOUNTER — HEALTH MAINTENANCE LETTER (OUTPATIENT)
Age: 77
End: 2022-06-11

## 2022-10-22 ENCOUNTER — HEALTH MAINTENANCE LETTER (OUTPATIENT)
Age: 77
End: 2022-10-22

## 2023-04-01 ENCOUNTER — HEALTH MAINTENANCE LETTER (OUTPATIENT)
Age: 78
End: 2023-04-01

## 2023-06-01 ENCOUNTER — HEALTH MAINTENANCE LETTER (OUTPATIENT)
Age: 78
End: 2023-06-01

## 2023-11-05 ENCOUNTER — HEALTH MAINTENANCE LETTER (OUTPATIENT)
Age: 78
End: 2023-11-05